# Patient Record
Sex: MALE | Race: AMERICAN INDIAN OR ALASKA NATIVE | NOT HISPANIC OR LATINO | URBAN - METROPOLITAN AREA
[De-identification: names, ages, dates, MRNs, and addresses within clinical notes are randomized per-mention and may not be internally consistent; named-entity substitution may affect disease eponyms.]

---

## 2018-09-14 ENCOUNTER — APPOINTMENT (RX ONLY)
Dept: URBAN - METROPOLITAN AREA CLINIC 84 | Facility: CLINIC | Age: 63
Setting detail: DERMATOLOGY
End: 2018-09-14

## 2018-09-14 DIAGNOSIS — D485 NEOPLASM OF UNCERTAIN BEHAVIOR OF SKIN: ICD-10-CM

## 2018-09-14 DIAGNOSIS — L82.1 OTHER SEBORRHEIC KERATOSIS: ICD-10-CM

## 2018-09-14 PROBLEM — J45.909 UNSPECIFIED ASTHMA, UNCOMPLICATED: Status: ACTIVE | Noted: 2018-09-14

## 2018-09-14 PROBLEM — J30.1 ALLERGIC RHINITIS DUE TO POLLEN: Status: ACTIVE | Noted: 2018-09-14

## 2018-09-14 PROBLEM — I10 ESSENTIAL (PRIMARY) HYPERTENSION: Status: ACTIVE | Noted: 2018-09-14

## 2018-09-14 PROBLEM — L57.0 ACTINIC KERATOSIS: Status: ACTIVE | Noted: 2018-09-14

## 2018-09-14 PROBLEM — D48.5 NEOPLASM OF UNCERTAIN BEHAVIOR OF SKIN: Status: ACTIVE | Noted: 2018-09-14

## 2018-09-14 PROCEDURE — 11100: CPT

## 2018-09-14 PROCEDURE — ? PHOTO-DOCUMENTATION

## 2018-09-14 PROCEDURE — 99213 OFFICE O/P EST LOW 20 MIN: CPT | Mod: 25

## 2018-09-14 PROCEDURE — ? COUNSELING

## 2018-09-14 PROCEDURE — ? BIOPSY BY SHAVE METHOD

## 2018-09-14 ASSESSMENT — LOCATION SIMPLE DESCRIPTION DERM
LOCATION SIMPLE: UPPER BACK
LOCATION SIMPLE: NOSE

## 2018-09-14 ASSESSMENT — LOCATION ZONE DERM
LOCATION ZONE: TRUNK
LOCATION ZONE: NOSE

## 2018-09-14 ASSESSMENT — LOCATION DETAILED DESCRIPTION DERM
LOCATION DETAILED: NASAL DORSUM
LOCATION DETAILED: SUPERIOR THORACIC SPINE

## 2018-09-14 NOTE — PROCEDURE: BIOPSY BY SHAVE METHOD
Anesthesia Type: 1% lidocaine with epinephrine
Electrodesiccation Text: The wound bed was treated with electrodesiccation after the biopsy was performed.
Biopsy Method: Personna blade
Render Post-Care Instructions In Note?: no
X Size Of Lesion In Cm: 0
Detail Level: Detailed
Size Of Lesion In Cm: 0.5
Silver Nitrate Text: The wound bed was treated with silver nitrate after the biopsy was performed.
Biopsy Type: H and E
Consent: Verbal consent was obtained and risks were reviewed including but not limited to scarring, infection, bleeding, and scabbing.
Dressing: Band-Aid
Billing Type: Third-Party Bill
Post-Care Instructions: I reviewed with the patient in detail post-care instructions. Patient is to keep the biopsy site dry overnight, and then apply bacitracin twice daily until healed. Patient may apply hydrogen peroxide soaks to remove any crusting.
Curettage Text: The wound bed was treated with curettage after the biopsy was performed.
Type Of Destruction Used: Curettage
Wound Care: Petrolatum
Hemostasis: Drysol
Lab: 3
Electrodesiccation And Curettage Text: The wound bed was treated with electrodesiccation and curettage after the biopsy was performed.
Cryotherapy Text: The wound bed was treated with cryotherapy after the biopsy was performed.
Was A Bandage Applied: Yes
Depth Of Biopsy: dermis
Notification Instructions: Patient will be notified of biopsy results if action is need to be taken. OK to call the office in 2 weeks if patient desires confirmation.

## 2020-03-09 ENCOUNTER — APPOINTMENT (RX ONLY)
Dept: URBAN - METROPOLITAN AREA CLINIC 95 | Facility: CLINIC | Age: 65
Setting detail: DERMATOLOGY
End: 2020-03-09

## 2020-03-09 DIAGNOSIS — L82.0 INFLAMED SEBORRHEIC KERATOSIS: ICD-10-CM

## 2020-03-09 PROCEDURE — ? LIQUID NITROGEN

## 2020-03-09 PROCEDURE — ? COUNSELING

## 2020-03-09 PROCEDURE — 17110 DESTRUCTION B9 LES UP TO 14: CPT

## 2020-03-09 PROCEDURE — ? FULL BODY SKIN EXAM - DECLINED

## 2020-03-09 ASSESSMENT — LOCATION SIMPLE DESCRIPTION DERM: LOCATION SIMPLE: RIGHT FOREHEAD

## 2020-03-09 ASSESSMENT — LOCATION DETAILED DESCRIPTION DERM: LOCATION DETAILED: RIGHT INFERIOR FOREHEAD

## 2020-03-09 ASSESSMENT — LOCATION ZONE DERM: LOCATION ZONE: FACE

## 2020-08-07 NOTE — HPI: FULL BODY SKIN EXAMINATION
64
What Is The Reason For Today's Visit?: Full Body Skin Examination
What Is The Reason For Today's Visit? (Being Monitored For X): concerning skin lesions on an annual basis
How Severe Are Your Spot(S)?: mild

## 2021-08-13 ENCOUNTER — APPOINTMENT (RX ONLY)
Dept: URBAN - METROPOLITAN AREA CLINIC 84 | Facility: CLINIC | Age: 66
Setting detail: DERMATOLOGY
End: 2021-08-13

## 2021-08-13 DIAGNOSIS — L81.4 OTHER MELANIN HYPERPIGMENTATION: ICD-10-CM

## 2021-08-13 DIAGNOSIS — D22 MELANOCYTIC NEVI: ICD-10-CM

## 2021-08-13 DIAGNOSIS — D18.0 HEMANGIOMA: ICD-10-CM

## 2021-08-13 DIAGNOSIS — L57.0 ACTINIC KERATOSIS: ICD-10-CM

## 2021-08-13 DIAGNOSIS — L82.1 OTHER SEBORRHEIC KERATOSIS: ICD-10-CM

## 2021-08-13 DIAGNOSIS — D485 NEOPLASM OF UNCERTAIN BEHAVIOR OF SKIN: ICD-10-CM

## 2021-08-13 PROBLEM — D48.5 NEOPLASM OF UNCERTAIN BEHAVIOR OF SKIN: Status: ACTIVE | Noted: 2021-08-13

## 2021-08-13 PROBLEM — D18.01 HEMANGIOMA OF SKIN AND SUBCUTANEOUS TISSUE: Status: ACTIVE | Noted: 2021-08-13

## 2021-08-13 PROBLEM — D22.5 MELANOCYTIC NEVI OF TRUNK: Status: ACTIVE | Noted: 2021-08-13

## 2021-08-13 PROCEDURE — 11102 TANGNTL BX SKIN SINGLE LES: CPT

## 2021-08-13 PROCEDURE — 17000 DESTRUCT PREMALG LESION: CPT | Mod: 59

## 2021-08-13 PROCEDURE — ? LIQUID NITROGEN

## 2021-08-13 PROCEDURE — ? BIOPSY BY SHAVE METHOD

## 2021-08-13 PROCEDURE — ? TREATMENT REGIMEN

## 2021-08-13 PROCEDURE — ? COUNSELING

## 2021-08-13 PROCEDURE — 99213 OFFICE O/P EST LOW 20 MIN: CPT | Mod: 25

## 2021-08-13 PROCEDURE — 17003 DESTRUCT PREMALG LES 2-14: CPT

## 2021-08-13 ASSESSMENT — LOCATION DETAILED DESCRIPTION DERM
LOCATION DETAILED: PERIUMBILICAL SKIN
LOCATION DETAILED: EPIGASTRIC SKIN
LOCATION DETAILED: RIGHT MEDIAL FRONTAL SCALP
LOCATION DETAILED: RIGHT SUPERIOR FOREHEAD
LOCATION DETAILED: LEFT INFERIOR UPPER BACK
LOCATION DETAILED: LEFT MEDIAL FRONTAL SCALP

## 2021-08-13 ASSESSMENT — LOCATION SIMPLE DESCRIPTION DERM
LOCATION SIMPLE: RIGHT FOREHEAD
LOCATION SIMPLE: ABDOMEN
LOCATION SIMPLE: RIGHT SCALP
LOCATION SIMPLE: LEFT UPPER BACK
LOCATION SIMPLE: LEFT SCALP

## 2021-08-13 ASSESSMENT — LOCATION ZONE DERM
LOCATION ZONE: TRUNK
LOCATION ZONE: FACE
LOCATION ZONE: SCALP

## 2021-08-27 ENCOUNTER — APPOINTMENT (RX ONLY)
Dept: URBAN - METROPOLITAN AREA CLINIC 84 | Facility: CLINIC | Age: 66
Setting detail: DERMATOLOGY
End: 2021-08-27

## 2021-08-27 DIAGNOSIS — L84 CORNS AND CALLOSITIES: ICD-10-CM

## 2021-08-27 PROBLEM — C44.519 BASAL CELL CARCINOMA OF SKIN OF OTHER PART OF TRUNK: Status: ACTIVE | Noted: 2021-08-27

## 2021-08-27 PROCEDURE — ? COUNSELING

## 2021-08-27 PROCEDURE — 99212 OFFICE O/P EST SF 10 MIN: CPT | Mod: 25

## 2021-08-27 PROCEDURE — ? CURETTAGE AND DESTRUCTION

## 2021-08-27 PROCEDURE — 17262 DSTRJ MAL LES T/A/L 1.1-2.0: CPT

## 2021-08-27 ASSESSMENT — LOCATION ZONE DERM: LOCATION ZONE: FINGER

## 2021-08-27 ASSESSMENT — LOCATION SIMPLE DESCRIPTION DERM: LOCATION SIMPLE: LEFT THUMB

## 2021-08-27 ASSESSMENT — LOCATION DETAILED DESCRIPTION DERM: LOCATION DETAILED: LEFT DISTAL RADIAL THUMB

## 2021-08-27 NOTE — PROCEDURE: CURETTAGE AND DESTRUCTION
Detail Level: Detailed
Number Of Curettages: 3
Size Of Lesion In Cm: 0.8
Size Of Lesion After Curettage: 1.3
Add Intralesional Injection: No
Total Volume (Ccs): 1
Anesthesia Type: 1% lidocaine with epinephrine
Cautery Type: electrodesiccation
What Was Performed First?: Curettage
Final Size Statement: The size of the lesion after curettage was
Additional Information: (Optional): The wound was cleaned, and a pressure dressing was applied.  The patient received detailed post-op instructions.
Consent was obtained from the patient. The risks, benefits and alternatives to therapy were discussed in detail. Specifically, the risks of infection, scarring, bleeding, prolonged wound healing, nerve injury, incomplete removal, allergy to anesthesia and recurrence were addressed. Alternatives to ED&C, such as: surgical removal and XRT were also discussed.  Prior to the procedure, the treatment site was clearly identified and confirmed by the patient. All components of Universal Protocol/PAUSE Rule completed.
Post-Care Instructions: I reviewed with the patient in detail post-care instructions. Patient is to keep the area dry for 48 hours, and not to engage in any swimming until the area is healed. Should the patient develop any fevers, chills, bleeding, severe pain patient will contact the office immediately.
Bill As A Line Item Or As Units: Line Item

## 2022-03-04 ENCOUNTER — APPOINTMENT (RX ONLY)
Dept: URBAN - METROPOLITAN AREA CLINIC 84 | Facility: CLINIC | Age: 67
Setting detail: DERMATOLOGY
End: 2022-03-04

## 2022-03-04 DIAGNOSIS — Z85.828 PERSONAL HISTORY OF OTHER MALIGNANT NEOPLASM OF SKIN: ICD-10-CM | Status: RESOLVED

## 2022-03-04 DIAGNOSIS — D22 MELANOCYTIC NEVI: ICD-10-CM

## 2022-03-04 DIAGNOSIS — D18.0 HEMANGIOMA: ICD-10-CM

## 2022-03-04 DIAGNOSIS — L85.3 XEROSIS CUTIS: ICD-10-CM | Status: STABLE

## 2022-03-04 DIAGNOSIS — L81.4 OTHER MELANIN HYPERPIGMENTATION: ICD-10-CM

## 2022-03-04 DIAGNOSIS — L82.1 OTHER SEBORRHEIC KERATOSIS: ICD-10-CM

## 2022-03-04 PROBLEM — D18.01 HEMANGIOMA OF SKIN AND SUBCUTANEOUS TISSUE: Status: ACTIVE | Noted: 2022-03-04

## 2022-03-04 PROBLEM — D22.5 MELANOCYTIC NEVI OF TRUNK: Status: ACTIVE | Noted: 2022-03-04

## 2022-03-04 PROCEDURE — ? COUNSELING

## 2022-03-04 PROCEDURE — 99213 OFFICE O/P EST LOW 20 MIN: CPT

## 2022-03-04 PROCEDURE — ? FULL BODY SKIN EXAM

## 2022-03-04 PROCEDURE — ? TREATMENT REGIMEN

## 2022-03-04 ASSESSMENT — LOCATION DETAILED DESCRIPTION DERM
LOCATION DETAILED: EPIGASTRIC SKIN
LOCATION DETAILED: LEFT ANTERIOR DISTAL THIGH
LOCATION DETAILED: SUPERIOR THORACIC SPINE
LOCATION DETAILED: RIGHT ANTERIOR DISTAL THIGH
LOCATION DETAILED: PERIUMBILICAL SKIN

## 2022-03-04 ASSESSMENT — LOCATION ZONE DERM
LOCATION ZONE: TRUNK
LOCATION ZONE: LEG

## 2022-03-04 ASSESSMENT — LOCATION SIMPLE DESCRIPTION DERM
LOCATION SIMPLE: RIGHT THIGH
LOCATION SIMPLE: LEFT THIGH
LOCATION SIMPLE: ABDOMEN
LOCATION SIMPLE: UPPER BACK

## 2023-11-17 ENCOUNTER — APPOINTMENT (RX ONLY)
Dept: URBAN - METROPOLITAN AREA CLINIC 84 | Facility: CLINIC | Age: 68
Setting detail: DERMATOLOGY
End: 2023-11-17

## 2023-11-17 DIAGNOSIS — D22 MELANOCYTIC NEVI: ICD-10-CM

## 2023-11-17 DIAGNOSIS — L82.1 OTHER SEBORRHEIC KERATOSIS: ICD-10-CM

## 2023-11-17 DIAGNOSIS — Z85.828 PERSONAL HISTORY OF OTHER MALIGNANT NEOPLASM OF SKIN: ICD-10-CM

## 2023-11-17 DIAGNOSIS — E65 LOCALIZED ADIPOSITY: ICD-10-CM

## 2023-11-17 DIAGNOSIS — D36.1 BENIGN NEOPLASM OF PERIPHERAL NERVES AND AUTONOMIC NERVOUS SYSTEM: ICD-10-CM

## 2023-11-17 DIAGNOSIS — L57.8 OTHER SKIN CHANGES DUE TO CHRONIC EXPOSURE TO NONIONIZING RADIATION: ICD-10-CM

## 2023-11-17 DIAGNOSIS — L57.0 ACTINIC KERATOSIS: ICD-10-CM

## 2023-11-17 DIAGNOSIS — L81.4 OTHER MELANIN HYPERPIGMENTATION: ICD-10-CM

## 2023-11-17 PROBLEM — D36.14 BENIGN NEOPLASM OF PERIPHERAL NERVES AND AUTONOMIC NERVOUS SYSTEM OF THORAX: Status: ACTIVE | Noted: 2023-11-17

## 2023-11-17 PROBLEM — D22.5 MELANOCYTIC NEVI OF TRUNK: Status: ACTIVE | Noted: 2023-11-17

## 2023-11-17 PROCEDURE — ? LIQUID NITROGEN

## 2023-11-17 PROCEDURE — ? COUNSELING

## 2023-11-17 PROCEDURE — 17003 DESTRUCT PREMALG LES 2-14: CPT

## 2023-11-17 PROCEDURE — ? SUNSCREEN RECOMMENDATIONS

## 2023-11-17 PROCEDURE — 17000 DESTRUCT PREMALG LESION: CPT

## 2023-11-17 PROCEDURE — ? FULL BODY SKIN EXAM

## 2023-11-17 PROCEDURE — 99213 OFFICE O/P EST LOW 20 MIN: CPT | Mod: 25

## 2023-11-17 PROCEDURE — ? ADDITIONAL NOTES

## 2023-11-17 ASSESSMENT — LOCATION SIMPLE DESCRIPTION DERM
LOCATION SIMPLE: RIGHT FOREHEAD
LOCATION SIMPLE: ABDOMEN
LOCATION SIMPLE: RIGHT PRETIBIAL REGION
LOCATION SIMPLE: LEFT FOREHEAD
LOCATION SIMPLE: RIGHT CHEEK
LOCATION SIMPLE: RIGHT LOWER BACK
LOCATION SIMPLE: RIGHT FOREARM
LOCATION SIMPLE: CHEST
LOCATION SIMPLE: LEFT FOREARM
LOCATION SIMPLE: LEFT PRETIBIAL REGION

## 2023-11-17 ASSESSMENT — LOCATION DETAILED DESCRIPTION DERM
LOCATION DETAILED: EPIGASTRIC SKIN
LOCATION DETAILED: LEFT DISTAL DORSAL FOREARM
LOCATION DETAILED: LEFT PROXIMAL PRETIBIAL REGION
LOCATION DETAILED: PERIUMBILICAL SKIN
LOCATION DETAILED: STERNUM
LOCATION DETAILED: LEFT SUPERIOR FOREHEAD
LOCATION DETAILED: RIGHT DISTAL PRETIBIAL REGION
LOCATION DETAILED: RIGHT CENTRAL MALAR CHEEK
LOCATION DETAILED: RIGHT SUPERIOR FOREHEAD
LOCATION DETAILED: RIGHT DISTAL DORSAL FOREARM
LOCATION DETAILED: RIGHT SUPERIOR MEDIAL MIDBACK

## 2023-11-17 ASSESSMENT — LOCATION ZONE DERM
LOCATION ZONE: ARM
LOCATION ZONE: LEG
LOCATION ZONE: FACE
LOCATION ZONE: TRUNK

## 2023-11-17 NOTE — PROCEDURE: LIQUID NITROGEN
Render Note In Bullet Format When Appropriate: No
Show Aperture Variable?: Yes
Consent: The patient's consent was obtained including but not limited to risks of crusting, scabbing, blistering, scarring, darker or lighter pigmentary change, recurrence, incomplete removal and infection.
Detail Level: Detailed
Number Of Freeze-Thaw Cycles: 1 freeze-thaw cycle
Duration Of Freeze Thaw-Cycle (Seconds): 2
Post-Care Instructions: I reviewed with the patient in detail post-care instructions. Patient is to wear sunprotection, and avoid picking at any of the treated lesions. Pt may apply Vaseline to crusted or scabbing areas.

## 2024-05-14 ENCOUNTER — APPOINTMENT (RX ONLY)
Dept: URBAN - METROPOLITAN AREA CLINIC 94 | Facility: CLINIC | Age: 69
Setting detail: DERMATOLOGY
End: 2024-05-14

## 2024-05-14 DIAGNOSIS — L82.1 OTHER SEBORRHEIC KERATOSIS: ICD-10-CM

## 2024-05-14 DIAGNOSIS — L57.0 ACTINIC KERATOSIS: ICD-10-CM | Status: INADEQUATELY CONTROLLED

## 2024-05-14 PROCEDURE — ? COUNSELING

## 2024-05-14 PROCEDURE — ? FULL BODY SKIN EXAM - DECLINED

## 2024-05-14 PROCEDURE — ? PRESCRIPTION

## 2024-05-14 PROCEDURE — ? PRESCRIPTION MEDICATION MANAGEMENT

## 2024-05-14 PROCEDURE — 99213 OFFICE O/P EST LOW 20 MIN: CPT

## 2024-05-14 RX ORDER — FLUOROURACIL 5 MG/G
CREAM TOPICAL BID
Qty: 40 | Refills: 1 | Status: ERX | COMMUNITY
Start: 2024-05-14

## 2024-05-14 RX ADMIN — FLUOROURACIL: 5 CREAM TOPICAL at 00:00

## 2024-05-14 ASSESSMENT — LOCATION SIMPLE DESCRIPTION DERM
LOCATION SIMPLE: RIGHT FOREHEAD
LOCATION SIMPLE: RIGHT CHEEK
LOCATION SIMPLE: LEFT FOREHEAD
LOCATION SIMPLE: LEFT CHEEK

## 2024-05-14 ASSESSMENT — LOCATION DETAILED DESCRIPTION DERM
LOCATION DETAILED: LEFT CENTRAL MALAR CHEEK
LOCATION DETAILED: RIGHT CENTRAL MALAR CHEEK
LOCATION DETAILED: LEFT MEDIAL FOREHEAD
LOCATION DETAILED: RIGHT FOREHEAD

## 2024-05-14 ASSESSMENT — LOCATION ZONE DERM: LOCATION ZONE: FACE

## 2024-05-14 NOTE — PROCEDURE: PRESCRIPTION MEDICATION MANAGEMENT
Initiate Treatment: Fluorouracil cream BID X 2 weeks
Detail Level: Zone
Render In Strict Bullet Format?: No

## 2024-06-26 ENCOUNTER — APPOINTMENT (RX ONLY)
Dept: URBAN - METROPOLITAN AREA CLINIC 94 | Facility: CLINIC | Age: 69
Setting detail: DERMATOLOGY
End: 2024-06-26

## 2024-06-26 DIAGNOSIS — L82.1 OTHER SEBORRHEIC KERATOSIS: ICD-10-CM

## 2024-06-26 DIAGNOSIS — L57.0 ACTINIC KERATOSIS: ICD-10-CM | Status: RESOLVED

## 2024-06-26 PROCEDURE — ? COUNSELING

## 2024-06-26 PROCEDURE — ? FULL BODY SKIN EXAM - DECLINED

## 2024-06-26 PROCEDURE — 99212 OFFICE O/P EST SF 10 MIN: CPT

## 2024-06-26 ASSESSMENT — LOCATION SIMPLE DESCRIPTION DERM
LOCATION SIMPLE: RIGHT CHEEK
LOCATION SIMPLE: LEFT CHEEK

## 2024-06-26 ASSESSMENT — LOCATION DETAILED DESCRIPTION DERM
LOCATION DETAILED: RIGHT CENTRAL MALAR CHEEK
LOCATION DETAILED: LEFT CENTRAL MALAR CHEEK

## 2024-06-26 ASSESSMENT — LOCATION ZONE DERM: LOCATION ZONE: FACE

## 2024-10-16 ENCOUNTER — APPOINTMENT (RX ONLY)
Dept: URBAN - METROPOLITAN AREA CLINIC 94 | Facility: CLINIC | Age: 69
Setting detail: DERMATOLOGY
End: 2024-10-16

## 2024-10-16 DIAGNOSIS — D18.0 HEMANGIOMA: ICD-10-CM | Status: INADEQUATELY CONTROLLED

## 2024-10-16 PROBLEM — D48.5 NEOPLASM OF UNCERTAIN BEHAVIOR OF SKIN: Status: ACTIVE | Noted: 2024-10-16

## 2024-10-16 PROCEDURE — 11102 TANGNTL BX SKIN SINGLE LES: CPT

## 2024-10-16 PROCEDURE — ? BIOPSY BY SHAVE METHOD

## 2024-10-16 ASSESSMENT — LOCATION ZONE DERM: LOCATION ZONE: TRUNK

## 2024-10-16 ASSESSMENT — LOCATION SIMPLE DESCRIPTION DERM: LOCATION SIMPLE: RIGHT LOWER BACK

## 2024-10-16 ASSESSMENT — LOCATION DETAILED DESCRIPTION DERM: LOCATION DETAILED: RIGHT SUPERIOR LATERAL MIDBACK

## 2024-10-16 NOTE — PROCEDURE: BIOPSY BY SHAVE METHOD
Detail Level: Detailed
Depth Of Biopsy: dermis
Was A Bandage Applied: Yes
Size Of Lesion In Cm: 0.5
X Size Of Lesion In Cm: 0
Biopsy Type: H and E
Biopsy Method: Personna blade
Anesthesia Type: 1% lidocaine with epinephrine
Hemostasis: Drysol
Wound Care: Petrolatum
Dressing: Band-Aid
Destruction After The Procedure: No
Type Of Destruction Used: Curettage
Curettage Text: The wound bed was treated with curettage after the biopsy was performed.
Cryotherapy Text: The wound bed was treated with cryotherapy after the biopsy was performed.
Electrodesiccation Text: The wound bed was treated with electrodesiccation after the biopsy was performed.
Electrodesiccation And Curettage Text: The wound bed was treated with electrodesiccation and curettage after the biopsy was performed.
Silver Nitrate Text: The wound bed was treated with silver nitrate after the biopsy was performed.
Lab: 6
Lab Facility: [id number]
Consent: Verbal consent was obtained and risks were reviewed including but not limited to scarring, infection, bleeding, scabbing, incomplete removal, nerve damage and allergy to anesthesia.
Post-Care Instructions: I reviewed with the patient in detail post-care instructions. Patient is to keep the biopsy site clean with gentle soap and warm water and then apply aquaphor twice daily until healed.
Notification Instructions: Patient will be notified of biopsy results. However, patient instructed to call the office if not contacted within 2 weeks.
Billing Type: Third-Party Bill
Information: Selecting Yes will display possible errors in your note based on the variables you have selected. This validation is only offered as a suggestion for you. PLEASE NOTE THAT THE VALIDATION TEXT WILL BE REMOVED WHEN YOU FINALIZE YOUR NOTE. IF YOU WANT TO FAX A PRELIMINARY NOTE YOU WILL NEED TO TOGGLE THIS TO 'NO' IF YOU DO NOT WANT IT IN YOUR FAXED NOTE.